# Patient Record
Sex: MALE | Race: WHITE
[De-identification: names, ages, dates, MRNs, and addresses within clinical notes are randomized per-mention and may not be internally consistent; named-entity substitution may affect disease eponyms.]

---

## 2018-09-24 ENCOUNTER — HOSPITAL ENCOUNTER (OUTPATIENT)
Dept: HOSPITAL 80 - FSGY | Age: 58
Discharge: HOME | End: 2018-09-24
Payer: COMMERCIAL

## 2018-09-24 VITALS — SYSTOLIC BLOOD PRESSURE: 110 MMHG | DIASTOLIC BLOOD PRESSURE: 69 MMHG

## 2018-09-24 DIAGNOSIS — Z12.11: Primary | ICD-10-CM

## 2018-09-24 PROCEDURE — 0DJD8ZZ INSPECTION OF LOWER INTESTINAL TRACT, VIA NATURAL OR ARTIFICIAL OPENING ENDOSCOPIC: ICD-10-PCS

## 2018-09-24 NOTE — PDANEPAE
ANE History of Present Illness





screening





ANE Past Medical History





- Cardiovascular History


Hx Hypertension: No


Hx Arrhythmias: No


Hx Chest Pain: No


Hx Coronary Artery / Peripheral Vascular Disease: No


Hx CHF / Valvular Disease: No


Hx Palpitations: No


Cardiovascular History Comment: bp RUNS LOW





- Pulmonary History


Hx COPD: No


Hx Asthma/Reactive Airway Disease: No


Hx Recent Upper Respiratory Infection: No


Hx Oxygen in Use at Home: No


Hx Sleep Apnea: No


Sleep Apnea Screening Result - Last Documented: Negative





- Neurologic History


Hx Cerebrovascular Accident: No


Hx Seizures: No


Hx Dementia: No





- Endocrine History


Hx Diabetes: No





- Renal History


Hx Renal Disorders: No





- Liver History


Hx Hepatic Disorders: No





- Neurological & Psychiatric Hx


Hx Neurological and Psychiatric Disorders: Yes


Neurological / Psychiatric History Comment: MS wheelchair bound





- Cancer History


Hx Cancer: Yes


Cancer History Comment: melanoma removed





- Congenital Disorder History


Hx Congenital Disorders: No





- GI History


Hx Gastrointestinal Disorders: No





- Other Health History


Other Health History: glaucoma





- Chronic Pain History


Chronic Pain: Yes (feet)





- Surgical History


Prior Surgeries: NONE





ANE Review of Systems


Review of Systems: 








- Exercise capacity


METS (RN): 3 METS





ANE Patient History





- Allergies


Allergies/Adverse Reactions: 








No Known Allergies Allergy (Verified 07/27/18 12:15)


 








- Home Medications


Home Medications: 








Baclofen [Lioresal (RX)]  05/07/12 [Last Taken Unknown]


Gabapentin [Neurontin 400 MG (RX)]  05/07/12 [Last Taken Unknown]


NORTRIPTYLINE HCL [Pamelor 75 mg]  05/07/12 [Last Taken Unknown]


Rebif  05/07/12 [Last Taken Unknown]


clonAZEPAM [Klonopin]  05/07/12 [Last Taken Unknown]


ALPHAGAN P 0.1% (*)  07/27/18 [Last Taken Unknown]


AMPYRA  07/27/18 [Last Taken Unknown]


Dha  07/27/18 [Last Taken Unknown]


Finasteride  07/27/18 [Last Taken Unknown]


Fish Oil 1000 mg (*)  07/27/18 [Last Taken Unknown]


Lumigan 0.01% (*)  07/27/18 [Last Taken Unknown]


Naltrexone  07/27/18 [Last Taken Unknown]


Testosterone  07/27/18 [Last Taken Unknown]


Trospium Chloride  07/27/18 [Last Taken Unknown]








- NPO status


NPO Since - Liquids (Date): 09/24/18


NPO Since - Liquids (Time): 12:00


NPO Since - Solids (Date): 09/23/18


NPO Since - Solids (Time): 08:00





- Smoking Hx


Smoking Status: Former smoker





- Family Anes Hx


Family Hx Anesthesia Complications: NONE





ANE Labs/Vital Signs





- Vital Signs


Blood Pressure: 108/66


Heart Rate: 80


Respiratory Rate: 16


O2 Sat (%): 94


Height: 182.88 cm


Weight: 74.843 kg





ANE Physical Exam





- Airway


Neck exam: FROM


Mallampati Score: Class 1


Mouth exam: normal dental/mouth exam





- Pulmonary


Pulmonary: no respiratory distress





- Cardiovascular


Cardiovascular: regular rate and rhythym





- ASA Status


ASA Status: II





ANE Anesthesia Plan


Total IV Anesthesia: Yes

## 2018-09-27 NOTE — GIREPORT
[facility]

Surgical Services - Endoscopy Department

_____________________________________________________________________________________________________
_______

Patient Name: Mike Quesada                         Procedure Date: 9/24/2018 1:11 PM

MRN: [id number]                                       Account Number: [id number]

Patient Type: Outpatient                             Attending  MD/ ER Physician: Miguel Sanderson MD

_____________________________________________________________________________________________________
_______

 

Procedure:                    Colonoscopy

Indications:                  Screening for colorectal malignant neoplasm

Patient Profile:              58 year old male presents for screening colonoscopy.

Providers:                    Miguel Sanderson MD

Medicines:                    Monitored Anesthesia Care

Complications:                No immediate complications.

Description of Procedure:     After obtaining informed consent, the scope was passed under direct vis
ion. 

                              Throughout the procedure, the patient's blood pressure, pulse, and oxyg
en 

                              saturations were monitored continuously. The Colonoscope with irrigatio
n 

                              channel was introduced through the anus and advanced to the cecum, 

                              identified by appendiceal orifice and ileocecal valve. The colonoscopy 
was 

                              performed without difficulty. The patient tolerated the procedure well.
 The 

                              quality of the bowel preparation was good.

Findings:                     The perianal and digital rectal examinations were normal. Pertinent 

                              negatives include no palpable rectal lesions.

                              The entire examined colon appeared normal.

Estimated Blood Loss:         Estimated blood loss: none.

Post Op Diagnosis:            - The entire examined colon is normal.

                              - No specimens collected.

Recommendation:               - Discharge patient to home (with escort).

                              - Resume previous diet.

                              - Continue present medications.

                              - Repeat colonoscopy in 10 years for screening purposes.

                              - Thank you for allowing me to participate in the care of your patient.


Attending Participation:

     I personally performed the entire procedure.

 

Miguel Sanderson MD

_____________

Miguel Sanderson MD

9/24/2018 2:19:45 PM

This report has been signed electronicallyMiguel Sanderson MD

Number of Addenda: 0

 

Note Initiated On: 9/24/2018 1:11 PM

Total Procedure Duration Time 0 hours 25 minutes 0 seconds 

[email or web address]

## 2018-11-05 ENCOUNTER — HOSPITAL ENCOUNTER (OUTPATIENT)
Dept: HOSPITAL 80 - FIMAGING | Age: 58
End: 2018-11-05
Attending: SPECIALIST
Payer: COMMERCIAL

## 2018-11-05 DIAGNOSIS — G35: Primary | ICD-10-CM

## 2018-11-05 DIAGNOSIS — R39.15: ICD-10-CM

## 2018-11-05 DIAGNOSIS — K59.00: ICD-10-CM

## 2018-11-05 DIAGNOSIS — N21.0: ICD-10-CM

## 2024-05-30 NOTE — PDGENHP
History & Physical


Chief Complaint: screening


History of Present Illness: 58 year old male presents for screening


Pertinent Past, Social, Family History: PMHx: MS


Relevant Physical Exam: HEENT: anicteric.  CV: RRR +s1s2.  lungs: CTAB.  Abd: 

soft, nt, + BS


Cardiorespiratory Assessment: ASA 3 [Dizziness] : dizziness [Cluster Headache] : cluster headaches [Negative] : Endocrine [FreeTextEntry9] : neck pain